# Patient Record
Sex: MALE | Race: WHITE | HISPANIC OR LATINO | URBAN - METROPOLITAN AREA
[De-identification: names, ages, dates, MRNs, and addresses within clinical notes are randomized per-mention and may not be internally consistent; named-entity substitution may affect disease eponyms.]

---

## 2019-06-16 ENCOUNTER — EMERGENCY (EMERGENCY)
Facility: HOSPITAL | Age: 44
LOS: 1 days | Discharge: HOME | End: 2019-06-16
Attending: EMERGENCY MEDICINE
Payer: SELF-PAY

## 2019-06-16 VITALS
HEART RATE: 73 BPM | TEMPERATURE: 99 F | DIASTOLIC BLOOD PRESSURE: 79 MMHG | WEIGHT: 179.9 LBS | RESPIRATION RATE: 20 BRPM | SYSTOLIC BLOOD PRESSURE: 125 MMHG | HEIGHT: 64 IN | OXYGEN SATURATION: 99 %

## 2019-06-16 PROCEDURE — T1013: CPT

## 2019-06-16 PROCEDURE — 99283 EMERGENCY DEPT VISIT LOW MDM: CPT | Mod: 25

## 2019-06-16 PROCEDURE — 99053 MED SERV 10PM-8AM 24 HR FAC: CPT

## 2019-06-16 PROCEDURE — 90715 TDAP VACCINE 7 YRS/> IM: CPT

## 2019-06-16 PROCEDURE — 90471 IMMUNIZATION ADMIN: CPT

## 2019-06-16 RX ORDER — OFLOXACIN 0.3 %
1 DROPS OPHTHALMIC (EYE) ONCE
Refills: 0 | Status: COMPLETED | OUTPATIENT
Start: 2019-06-16 | End: 2019-06-16

## 2019-06-16 RX ORDER — TETANUS TOXOID, REDUCED DIPHTHERIA TOXOID AND ACELLULAR PERTUSSIS VACCINE, ADSORBED 5; 2.5; 8; 8; 2.5 [IU]/.5ML; [IU]/.5ML; UG/.5ML; UG/.5ML; UG/.5ML
0.5 SUSPENSION INTRAMUSCULAR ONCE
Refills: 0 | Status: COMPLETED | OUTPATIENT
Start: 2019-06-16 | End: 2019-06-16

## 2019-06-16 RX ADMIN — TETANUS TOXOID, REDUCED DIPHTHERIA TOXOID AND ACELLULAR PERTUSSIS VACCINE, ADSORBED 0.5 MILLILITER(S): 5; 2.5; 8; 8; 2.5 SUSPENSION INTRAMUSCULAR at 09:46

## 2019-06-16 RX ADMIN — Medication 1 DROP(S): at 09:41

## 2019-06-16 RX ADMIN — Medication 1 DROP(S): at 09:55

## 2019-06-16 NOTE — ED PROVIDER NOTE - CARE PROVIDER_API CALL
Pal Curry)  Ophthalmology  [address] [place] [address]  Phone: [phone]  Fax: [phone]  Follow Up Time:

## 2019-06-16 NOTE — ED PROVIDER NOTE - PROGRESS NOTE DETAILS
patient morgans lens with saline woods lamp obtained +FB centrally to L pupil, attempt made to remove with Q-tip, small debri removed, advised to f/u with optho within 24 hrs, will update tetanus and cover with oflox drops 20/50 L eye and 20/30 R eye, 20/30 b/l eye, no hyphema, EOMS intact, PEERLA, eye lids everted no e/o FB

## 2019-06-16 NOTE — ED ADULT TRIAGE NOTE - CHIEF COMPLAINT QUOTE
Patient arrived to ED today with c/o left eye injury yesterday.  Patient reports he was stacking pieces of cement at work and something may have gotten into his eye.

## 2019-06-16 NOTE — ED PROVIDER NOTE - ATTENDING CONTRIBUTION TO CARE
I, Enzo Martinez, performed a face to face bedside interview with this patient regarding history of present illness, review of symptoms and relevant past medical, social and family history.  I completed an independent physical examination. I have communicated the patient’s plan of care and disposition with the ACP.  44 year old male presents with L eye discomfort after cement splashed into it yesterday. Reports pain with eomi, but no changes in vision  Gen: NAD, well appearing  eye: EOMI, PERRLA, +conjunctival injection, no corneal abrasion but foreign body found abutting the pupil  CV: RRR  Pul: CTA b/l  Abd: Soft, non-distended, non-tender  Neuro: no focal deficits  Unable to remove fotreign body, acuity intact, stable for urgent optho f/u and dc

## 2019-06-16 NOTE — ED PROVIDER NOTE - OBJECTIVE STATEMENT
This is a 44 year old male with c/o FB sensation in L eye, reports yesterday around noon thinks cement entered his eye.  He denies wearing any goggles.  He reports pain to eye.  He reports worse with movement.  He is not sure when tetanus was.  He has not followed up with optho > 10 years.

## 2022-08-07 NOTE — ED ADULT TRIAGE NOTE - BMI (KG/M2)
Exercise your legs 1-2 hours daily by ambulating, bicycle riding or other physical activity.  Elevate your legs as much as possible when sitting.  Compression stockings would be helpful.  If symptoms worsen follow-up with your primary care provider   30.9

## 2022-09-23 NOTE — ED PROVIDER NOTE - EYES [+], MLM
Problem: Malnutrition  Goal: Improved Nutritional Intake  Intervention: Promote and Optimize Nutrition Support  Description: Perform a nutritional assessment; include a nutrition-focused physical exam.  Determine calorie, protein, vitamin, mineral and fluid requirements.  Initiate early nutrition support; enteral nutrition is preferred versus parenteral.  Optimize protein intake, unless contraindicated (e.g., hepatic encephalopathy, chronic renal failure).  Consider postpyloric versus gastric tube feeding for patient at increased risk of aspiration.  Advocate for and adjust infusion rate, formulation or volume based on feeding tolerance and clinical status (e.g., hemodynamic stability); minimize unnecessary interruptions.  Anticipate the need for a promotility agent if reduced gastric emptying or delayed bowel motility is suspected.  Monitor nutrition delivery to ensure safe practices (e.g., confirmation of tube placement, tube patency, medication delivery, head of bed elevation, oral care).  Initiate parenteral nutrition if enteral nutrition support is contraindicated.  Flowsheets (Taken 9/23/2022 7572)  Nutrition Support Management:   tube feeding initiated   weight trending reviewed         FB Sensation